# Patient Record
Sex: FEMALE | Race: WHITE
[De-identification: names, ages, dates, MRNs, and addresses within clinical notes are randomized per-mention and may not be internally consistent; named-entity substitution may affect disease eponyms.]

---

## 2019-11-16 ENCOUNTER — HOSPITAL ENCOUNTER (EMERGENCY)
Dept: HOSPITAL 33 - ED | Age: 24
LOS: 1 days | Discharge: HOME | End: 2019-11-17
Payer: COMMERCIAL

## 2019-11-16 DIAGNOSIS — Z79.899: ICD-10-CM

## 2019-11-16 DIAGNOSIS — F41.9: ICD-10-CM

## 2019-11-16 DIAGNOSIS — G40.909: Primary | ICD-10-CM

## 2019-11-16 DIAGNOSIS — F31.9: ICD-10-CM

## 2019-11-16 DIAGNOSIS — F19.10: ICD-10-CM

## 2019-11-16 PROCEDURE — 70450 CT HEAD/BRAIN W/O DYE: CPT

## 2019-11-16 PROCEDURE — 80053 COMPREHEN METABOLIC PANEL: CPT

## 2019-11-16 PROCEDURE — 80164 ASSAY DIPROPYLACETIC ACD TOT: CPT

## 2019-11-16 PROCEDURE — 80307 DRUG TEST PRSMV CHEM ANLYZR: CPT

## 2019-11-16 PROCEDURE — 83605 ASSAY OF LACTIC ACID: CPT

## 2019-11-16 PROCEDURE — 36415 COLL VENOUS BLD VENIPUNCTURE: CPT

## 2019-11-16 PROCEDURE — 71045 X-RAY EXAM CHEST 1 VIEW: CPT

## 2019-11-16 PROCEDURE — 84146 ASSAY OF PROLACTIN: CPT

## 2019-11-16 PROCEDURE — 99284 EMERGENCY DEPT VISIT MOD MDM: CPT

## 2019-11-16 PROCEDURE — 84703 CHORIONIC GONADOTROPIN ASSAY: CPT

## 2019-11-16 PROCEDURE — 85025 COMPLETE CBC W/AUTO DIFF WBC: CPT

## 2019-11-17 VITALS — SYSTOLIC BLOOD PRESSURE: 109 MMHG | HEART RATE: 97 BPM | DIASTOLIC BLOOD PRESSURE: 64 MMHG

## 2019-11-17 VITALS — OXYGEN SATURATION: 97 %

## 2019-11-17 LAB
ALBUMIN SERPL-MCNC: 3.8 G/DL (ref 3.5–5)
ALP SERPL-CCNC: 87 U/L (ref 38–126)
ALT SERPL-CCNC: 12 U/L (ref 0–35)
AMPHETAMINES UR QL: POSITIVE
ANION GAP SERPL CALC-SCNC: 12.2 MEQ/L (ref 5–15)
AST SERPL QL: 24 U/L (ref 14–36)
BARBITURATES UR QL: NEGATIVE
BASOPHILS # BLD AUTO: 0.01 10*3/UL (ref 0–0.4)
BASOPHILS NFR BLD AUTO: 0.2 % (ref 0–0.4)
BENZODIAZ UR QL SCN: POSITIVE
BILIRUB BLD-MCNC: 0.3 MG/DL (ref 0.2–1.3)
BLD SMEAR INTERP: YES
BUN SERPL-MCNC: 10 MG/DL (ref 7–17)
CALCIUM SPEC-MCNC: 9.3 MG/DL (ref 8.4–10.2)
CHLORIDE SERPL-SCNC: 101 MMOL/L (ref 98–107)
CO2 SERPL-SCNC: 32 MMOL/L (ref 22–30)
COCAINE UR QL SCN: NEGATIVE
CREAT SERPL-MCNC: 0.48 MG/DL (ref 0.52–1.04)
EOSINOPHIL # BLD AUTO: 0.02 10*3/UL (ref 0–0.5)
GLUCOSE SERPL-MCNC: 106 MG/DL (ref 74–106)
HCT VFR BLD AUTO: 32 % (ref 35–47)
HGB BLD-MCNC: 9.9 GM/DL (ref 12–16)
LYMPHOCYTES # SPEC AUTO: 1.39 10*3/UL (ref 1–4.6)
MCH RBC QN AUTO: 23 PG (ref 26–32)
MCHC RBC AUTO-ENTMCNC: 30.9 G/DL (ref 32–36)
METHADONE UR QL: NEGATIVE
MONOCYTES # BLD AUTO: 0.46 10*3/UL (ref 0–1.3)
OPIATES UR QL: POSITIVE
PCP UR QL CFM>20 NG/ML: NEGATIVE
PLATELET # BLD AUTO: 206 K/MM3 (ref 150–450)
POTASSIUM SERPLBLD-SCNC: 3.9 MMOL/L (ref 3.5–5.1)
PROT SERPL-MCNC: 8 G/DL (ref 6.3–8.2)
RBC # BLD AUTO: 4.3 M/MM3 (ref 4.1–5.4)
SODIUM SERPL-SCNC: 142 MMOL/L (ref 137–145)
THC UR QL SCN: NEGATIVE
WBC # BLD AUTO: 5.1 K/MM3 (ref 4–10.5)

## 2019-11-17 RX ADMIN — INSULIN HUMAN ONE MLS/HR: 100 INJECTION, SOLUTION PARENTERAL at 02:05

## 2019-11-17 NOTE — XRAY
Indication: Seizure.



Multiple contiguous axial images obtained through the head without contrast.



Comparison: None



Normal appearing brain parenchyma, ventricles, and bony calvarium.  Visualized

paranasal sinuses and mastoid air cells are clear.



Impression: Normal CT head without contrast exam.



Comment: Preliminary interpretation was made by [facility].  No discrepancy.



CTDI 59.97

## 2019-11-17 NOTE — XRAY
Indication: Seizure.



Comparison: None



Portable chest demonstrates normal heart, lungs, and bony thorax with

incidental left mediastinal/hilar/pulmonary calcified granulomas.

## 2019-11-17 NOTE — ERPHSYRPT
- History of Present Illness


Time Seen by Provider: 11/17/19 01:15


Source: patient, EMS


Exam Limitations: clinical condition


Patient Subjective Stated Complaint: pt states that she had seizure yesterday 

and today, pt states that she has infection in her spine, pt states that she 

gets infusions, pt states she had seizure after being pulled over by police, 

police states that pt convulsions for 2 minutes, police state that pt was 

lethargic prior to seizure, pt had seizure at 2320


Triage Nursing Assessment: pt came into the er via ambulance, pt is tearful, pt 

states that she had heroin at 1700,  pt states 10/10 pain to back, pupils 5 mm 

round and reactive,  and pushes normal


Physician History: 





patient is a 24-year-old white female was arrested by the police for drug abuse 

in position who had a seizure shortly after her arrest.  This was observed by 

the police she did seem postictal for a short time afterwards.


Timing/Duration: today


Severity of Symptoms-Max: moderate


Severity of Symptoms-Current: moderate


Associated Symptoms: anxiety


Previous symptoms: same symptoms as today


Allergies/Adverse Reactions: 








No Known Drug Allergies Allergy (Unverified 11/17/19 01:05)


 





Hx Tetanus, Diphtheria Vaccination/Date Given: No


Hx Influenza Vaccination/Date Given: No


Hx Pneumococcal Vaccination/Date Given: No





- Past Medical History


Neurological History: Seizures


Psycho-Social History: Anxiety, Bipolar, Depression


Other Medical History: discitis





- Social History


Smoking Status: Current every day smoker


How long have you smoked: 0.5


Exposure to second hand smoke: Yes


Drug Use: methamphetamines, heroin


Patient Lives Alone: Yes





- Female History


Hx Last Menstrual Period: 11/12/19


Hx Pregnant Now: No





- Review of Systems


Constitutional: No Fever, No Chills


Eyes: No Symptoms


Ears, Nose, & Throat: No Symptoms


Respiratory: No Cough, No Dyspnea


Cardiac: No Chest Pain, No Edema, No Syncope


Abdominal/Gastrointestinal: No Abdominal Pain, No Nausea, No Vomiting, No 

Diarrhea


Genitourinary Symptoms: No Dysuria


Musculoskeletal: No Back Pain, No Neck Pain


Skin: No Rash


Neurological: Seizure, No Dizziness, No Focal Weakness, No Sensory Changes


Psychological: No Symptoms


Endocrine: No Symptoms


All Other Systems: Reviewed and Negative





- Nursing Vital Signs


Nursing Vital Signs: 





 Initial Vital Signs











Pulse Rate  104 H  11/17/19 01:10


 


Blood Pressure  131/78   11/17/19 01:10


 


O2 Sat by Pulse Oximetry  100   11/17/19 01:10








 Pain Scale











Pain Intensity                 10

















- Physical Exam


General Appearance: mild distress


Eyes, Ears, Nose, Throat Exam: normal ENT inspection, moist mucous membranes


Neck Exam: normal inspection, non-tender, supple


Respiratory Exam: normal breath sounds, lungs clear, No respiratory distress


Cardiovascular Exam: regular rate/rhythm, No edema


Gastrointestinal/Abdominal Exam: soft, No tenderness, No distention


Extremities Exam: normal inspection, normal range of motion, No evidence of 

injury, No edema


Current Suicidality: denies suicide plan


Neurological Exam: alert, CNs II-XII nml as tested, oriented x 3


Skin Exam: normal color, warm, dry, No rash


SpO2: 100





- CT Exams


  ** Head


CT Interpretation: Negative, Tele-radiologist Report


Ordered Tests: 





 Active Orders 24 hr











 Category Date Time Status


 


 Clean Catch Urine Specimen STAT Care  11/17/19 00:30 Active


 


 IV Insertion STAT Care  11/17/19 00:01 Active


 


 CHEST 1 VIEW (PORTABLE) Stat Exams  11/17/19 00:01 Ordered


 


 HEAD WITHOUT CONTRAST [CT] Stat Exams  11/17/19 00:02 Ordered


 


 CBC W DIFF Stat Lab  11/17/19 00:37 Completed


 


 CMP Stat Lab  11/17/19 00:37 Completed


 


 HCG,QUALITATIVE URINE Stat Lab  11/17/19 01:14 Completed


 


 Lactic Acid Stat Lab  11/17/19 00:35 Completed


 


 Urine Triage Profile Stat Lab  11/17/19 01:14 Completed








Medication Summary











Generic Name Dose Route Start Last Admin





  Trade Name Freq  PRN Reason Stop Dose Admin


 


Valproate Sodium 500 mg/  105 mls @ 210 mls/hr  11/17/19 01:46  





  Sodium Chloride  IV  11/17/19 02:15  





  STAT ONE   





     





     





     





     














Discontinued Medications














Generic Name Dose Route Start Last Admin





  Trade Name Freq  PRN Reason Stop Dose Admin


 


Sodium Chloride  Confirm  11/17/19 01:58  





  Sodium Chloride 0.9% 100 Ml Ivpb  Administered  11/17/19 01:59  





  Dose   





  100 mls @ ud   





  IV   





  .STK-MED ONE   





     





     





     





     


 


Valproate Sodium  Confirm  11/17/19 01:58  





  Depacon 500 Mg/5 Ml***  Administered  11/17/19 01:59  





  Dose   





  500 mg   





  IV   





  .STK-MED ONE   





     





     





     





     











Lab/Rad Data: 





 Laboratory Result Diagrams





 11/17/19 00:37 





 11/17/19 00:37 





 Laboratory Results











  11/17/19 11/17/19 11/17/19 Range/Units





  01:14 00:37 00:37 


 


WBC     (4.0-10.5)  K/mm3


 


RBC     (4.1-5.4)  M/mm3


 


Hgb     (12.0-16.0)  gm/dl


 


Hct     (35-47)  %


 


MCV     ()  fl


 


MCH     (26-32)  pg


 


MCHC     (32-36)  g/dl


 


RDW     (11.5-14.0)  %


 


Plt Count     (150-450)  K/mm3


 


MPV     (6-9.5)  fl


 


Gran %     (36.0-66.0)  %


 


Eos # (Auto)     (0-0.5)  


 


Absolute Lymphs (auto)     (1.0-4.6)  


 


Absolute Monos (auto)     (0.0-1.3)  


 


Lymphocytes %     (24.0-44.0)  %


 


Monocytes %     (0.0-12.0)  %


 


Eosinophils %     (0.00-5.0)  %


 


Basophils %     (0.0-0.4)  %


 


Absolute Granulocytes     (1.4-6.9)  


 


Basophils #     (0-0.4)  


 


Sodium    142  (137-145)  mmol/L


 


Potassium    3.9  (3.5-5.1)  mmol/L


 


Chloride    101  ()  mmol/L


 


Carbon Dioxide    32 H  (22-30)  mmol/L


 


Anion Gap    12.2  (5-15)  MEQ/L


 


BUN    10  (7-17)  mg/dL


 


Creatinine    0.48 L  (0.52-1.04)  mg/dL


 


Estimated GFR    > 60.0  ML/MIN


 


Glucose    106  ()  mg/dL


 


Lactic Acid     (0.4-2.0)  


 


Calcium    9.3  (8.4-10.2)  mg/dL


 


Total Bilirubin    0.30  (0.2-1.3)  mg/dL


 


AST    24  (14-36)  U/L


 


ALT    12  (0-35)  U/L


 


Alkaline Phosphatase    87  ()  U/L


 


Serum Total Protein    8.0  (6.3-8.2)  g/dL


 


Albumin    3.8  (3.5-5.0)  g/dL


 


Urine HCG, Qual  NEGATIVE    (Negative)  


 


Valproic Acid   < 10.0 L   ()  ug/mL


 


Slides for Path Review     














  11/17/19 11/17/19 Range/Units





  00:37 00:35 


 


WBC  5.1   (4.0-10.5)  K/mm3


 


RBC  4.30   (4.1-5.4)  M/mm3


 


Hgb  9.9 L   (12.0-16.0)  gm/dl


 


Hct  32.0 L   (35-47)  %


 


MCV  74.4 L   ()  fl


 


MCH  23.0 L   (26-32)  pg


 


MCHC  30.9 L   (32-36)  g/dl


 


RDW  17.6 H   (11.5-14.0)  %


 


Plt Count  206   (150-450)  K/mm3


 


MPV  8.9   (6-9.5)  fl


 


Gran %  63.5   (36.0-66.0)  %


 


Eos # (Auto)  0.02   (0-0.5)  


 


Absolute Lymphs (auto)  1.39   (1.0-4.6)  


 


Absolute Monos (auto)  0.46   (0.0-1.3)  


 


Lymphocytes %  27.0   (24.0-44.0)  %


 


Monocytes %  8.9   (0.0-12.0)  %


 


Eosinophils %  0.4   (0.00-5.0)  %


 


Basophils %  0.2   (0.0-0.4)  %


 


Absolute Granulocytes  3.26   (1.4-6.9)  


 


Basophils #  0.01   (0-0.4)  


 


Sodium    (137-145)  mmol/L


 


Potassium    (3.5-5.1)  mmol/L


 


Chloride    ()  mmol/L


 


Carbon Dioxide    (22-30)  mmol/L


 


Anion Gap    (5-15)  MEQ/L


 


BUN    (7-17)  mg/dL


 


Creatinine    (0.52-1.04)  mg/dL


 


Estimated GFR    ML/MIN


 


Glucose    ()  mg/dL


 


Lactic Acid   0.9  (0.4-2.0)  


 


Calcium    (8.4-10.2)  mg/dL


 


Total Bilirubin    (0.2-1.3)  mg/dL


 


AST    (14-36)  U/L


 


ALT    (0-35)  U/L


 


Alkaline Phosphatase    ()  U/L


 


Serum Total Protein    (6.3-8.2)  g/dL


 


Albumin    (3.5-5.0)  g/dL


 


Urine HCG, Qual    (Negative)  


 


Valproic Acid    ()  ug/mL


 


Slides for Path Review  YES   














- Progress


Progress: improved


Counseled pt/family regarding: drug and/or alcohol abuse





- Departure


Departure Disposition: correction/detention


Clinical Impression: 


 Seizure disorder





Condition: Stable


Critical Care Time: No


Referrals: 


Provider,Unknown [Primary Care Provider] - 


Plan of Treatment: 


treatment plan will be to restart her on Depakote 500 mg twice a day to prevent 

seizures


Prescriptions: 


Divalproex Sodium [Depakote] 500 mg PO BID 30 Days #60 tablet.

## 2019-11-22 ENCOUNTER — HOSPITAL ENCOUNTER (EMERGENCY)
Dept: HOSPITAL 33 - ED | Age: 24
LOS: 1 days | Discharge: NURSING, REHAB OR LONG-TERM CARE | End: 2019-11-23
Payer: COMMERCIAL

## 2019-11-22 VITALS — OXYGEN SATURATION: 100 %

## 2019-11-22 DIAGNOSIS — G40.909: Primary | ICD-10-CM

## 2019-11-22 DIAGNOSIS — E87.6: ICD-10-CM

## 2019-11-22 PROCEDURE — 85025 COMPLETE CBC W/AUTO DIFF WBC: CPT

## 2019-11-22 PROCEDURE — 80053 COMPREHEN METABOLIC PANEL: CPT

## 2019-11-22 PROCEDURE — 82962 GLUCOSE BLOOD TEST: CPT

## 2019-11-22 PROCEDURE — 80164 ASSAY DIPROPYLACETIC ACD TOT: CPT

## 2019-11-22 PROCEDURE — 36415 COLL VENOUS BLD VENIPUNCTURE: CPT

## 2019-11-22 PROCEDURE — 99284 EMERGENCY DEPT VISIT MOD MDM: CPT

## 2019-11-22 PROCEDURE — 83605 ASSAY OF LACTIC ACID: CPT

## 2019-11-22 NOTE — ERPHSYRPT
- History of Present Illness


Time Seen by Provider: 11/22/19 23:42


Source: patient, EMS


Exam Limitations: no limitations


Physician History: 





24 years old female with history of IV drug abuse, seizure disorder not taking 

any medications from anxiety, depression currently in the [facility] for possession 

of drugs charges is brought in the ER with  2 seizure witnessed by [facility] staff 

prior to arrival.  There were generalized tonic-clonic with postictal phase for 

few minutes which is improved by the time patient presented in the ER.  Patient 

reports she remembers smelling strange and nothing not worse.  She usually have 

strange smell before seizure.  Regarding the [facility] staff she did not hit her 

head.  She is not complaining of headache, chest pain palpitations or shortness 

of breath.  No abdominal pain nausea or vomiting.  Patient is supposed to take 

Depakote but is not taking as she has decreased oral intake and it makes her 

more nauseated been vomiting.  No fever or chills reported.  Last drug use was 

almost a week ago.


Timing/Duration: today


Severity: moderate


Associated Symptoms: seizures


Allergies/Adverse Reactions: 








No Known Drug Allergies Allergy (Unverified 11/17/19 01:05)


 





Hx Tetanus, Diphtheria Vaccination/Date Given: No


Hx Influenza Vaccination/Date Given: No


Hx Pneumococcal Vaccination/Date Given: No





- Review of Systems


Constitutional: Fatigue


Eyes: No Symptoms


Ears, Nose, & Throat: Nose Congestion


Respiratory: No Symptoms


Cardiac: No Symptoms


Abdominal/Gastrointestinal: No Symptoms


Genitourinary Symptoms: No Symptoms


Musculoskeletal: No Symptoms


Skin: No Symptoms


Neurological: Seizure


Psychological: Anxiety, Other


Endocrine: No Symptoms


Hematologic/Lymphatic: No Symptoms


Immunological/Allergic: No Symptoms





- Past Medical History


Neurological History: Seizures


Psycho-Social History: Anxiety, Bipolar, Depression


Other Medical History: discitis





- Social History


Smoking Status: Current every day smoker


How long have you smoked: 0.5


Exposure to second hand smoke: Yes


Drug Use: methamphetamines, heroin


Patient Lives Alone: Yes





- Nursing Vital Signs


Nursing Vital Signs: 


 Initial Vital Signs











Temperature  98.4 F   11/22/19 23:42


 


Pulse Rate  56 L  11/22/19 23:42


 


Respiratory Rate  15   11/22/19 23:42


 


Blood Pressure  106/62   11/22/19 23:42


 


O2 Sat by Pulse Oximetry  100   11/22/19 23:42








 Pain Scale











Pain Intensity                 4

















- [place] Coma Scale


Best Eye Response ([place]): (4) open spontaneously


Best Verbal Response ([place]): (5) oriented


Best Motor Response ([place]annie): (6) obeys commands


[place] Total: 15





- Physical Exam


General Appearance: no apparent distress, alert


Eye Exam: bilateral eye: normal inspection, PERRL, EOMI


Ears, Nose, Throat Exam: normal ENT inspection, pharynx normal


Neck Exam: normal inspection, non-tender, supple, full range of motion


Respiratory: normal breath sounds, lungs clear, respiratory distress


Cardiovascular: regular rate/rhythm, normal heart sounds, normal peripheral 

pulses


Gastrointestinal: soft, normal bowel sounds, No tenderness, No distention


Back Exam: normal inspection, normal range of motion, No CVA tenderness


Extremity Exam: normal inspection, normal range of motion


Mental Status: alert, oriented x 3, cooperative


CNs Exam: normal hearing, normal speech, PERRL


Coordination/Gait: normal finger to nose, normal cerebellar function


Motor/Sensory: no motor deficit, no sensory deficit, no pronator drift


DTR: tricep (R): 2+, tricep (L): 2+, knee (R): 2+, knee (L): 2+


Skin Exam: normal color, warm, dry


**SpO2 Interpretation**: normal


O2 Delivery: Room Air





- Course


Nursing assessment & vital signs reviewed: Yes


Ordered Tests: 


 Active Orders 24 hr











 Category Date Time Status


 


 Accucheck STAT Care  11/22/19 23:44 Active


 


 IV Insertion STAT Care  11/22/19 23:44 Active


 


 Lactic Acid Stat Lab  11/22/19 23:44 Completed








Medication Summary














Discontinued Medications














Generic Name Dose Route Start Last Admin





  Trade Name Freq  PRN Reason Stop Dose Admin


 


Divalproex Sodium  1,000 mg  11/23/19 01:00  11/23/19 01:12





  Depakote Extended Release 250 Mg***  PO  12/23/19 00:59  1,000 mg





  1XONLY RAFFI   Administration





     





     





     





     


 


Sodium Chloride  1,000 mls @ 999 mls/hr  11/22/19 23:44  11/23/19 01:13





  Sodium Chloride 0.9% 1000 Ml  IV  11/23/19 00:44  Not Given





  .Q1H1M STA   





     





     





     





     


 


Valproate Sodium 500 mg/  105 mls @ 210 mls/hr  11/22/19 23:44  11/23/19 01:13





  Sodium Chloride  IV  11/23/19 00:13  Not Given





  STAT ONE   





     





     





     





     


 


Sodium Chloride  Confirm  11/22/19 23:51  





  Sodium Chloride 0.9% 100 Ml Ivpb  Administered  11/22/19 23:52  





  Dose   





  100 mls @ ud   





  IV   





  .STK-MED ONE   





     





     





     





     


 


Potassium Chloride  40 meq  11/23/19 00:56  11/23/19 01:12





  Klor Con 10 Meq***  PO  11/23/19 00:57  40 meq





  STAT ONE   Administration





     





     





     





     


 


Potassium Chloride  Confirm  11/23/19 01:01  





  Klor Con 10 Meq***  Administered  11/23/19 01:02  





  Dose   





  40 meq   





  PO   





  .STK-MED ONE   





     





     





     





     


 


Valproate Sodium  Confirm  11/22/19 23:50  





  Depacon 500 Mg/5 Ml***  Administered  11/22/19 23:51  





  Dose   





  500 mg   





  IV   





  .STK-MED ONE   





     





     





     





     











Lab/Rad Data: 


 Laboratory Result Diagrams





 11/22/19 00:10 





 11/22/19 00:10 





 Laboratory Results











  11/23/19 11/22/19 11/22/19 Range/Units





  00:15 00:10 00:10 


 


WBC     (4.0-10.5)  K/mm3


 


RBC     (4.1-5.4)  M/mm3


 


Hgb     (12.0-16.0)  gm/dl


 


Hct     (35-47)  %


 


MCV     ()  fl


 


MCH     (26-32)  pg


 


MCHC     (32-36)  g/dl


 


RDW     (11.5-14.0)  %


 


Plt Count     (150-450)  K/mm3


 


MPV     (6-9.5)  fl


 


Gran %     (36.0-66.0)  %


 


Eos # (Auto)     (0-0.5)  


 


Absolute Lymphs (auto)     (1.0-4.6)  


 


Absolute Monos (auto)     (0.0-1.3)  


 


Lymphocytes %     (24.0-44.0)  %


 


Monocytes %     (0.0-12.0)  %


 


Eosinophils %     (0.00-5.0)  %


 


Basophils %     (0.0-0.4)  %


 


Absolute Granulocytes     (1.4-6.9)  


 


Basophils #     (0-0.4)  


 


Sodium    142  (137-145)  mmol/L


 


Potassium    3.1 L  (3.5-5.1)  mmol/L


 


Chloride    104  ()  mmol/L


 


Carbon Dioxide    27  (22-30)  mmol/L


 


Anion Gap    14.0  (5-15)  MEQ/L


 


BUN    7  (7-17)  mg/dL


 


Creatinine    0.53  (0.52-1.04)  mg/dL


 


Estimated GFR    > 60.0  ML/MIN


 


Glucose    94  ()  mg/dL


 


Lactic Acid  1.5    (0.4-2.0)  


 


Calcium    9.1  (8.4-10.2)  mg/dL


 


Total Bilirubin    0.30  (0.2-1.3)  mg/dL


 


AST    15  (14-36)  U/L


 


ALT    10  (0-35)  U/L


 


Alkaline Phosphatase    86  ()  U/L


 


Serum Total Protein    7.9  (6.3-8.2)  g/dL


 


Albumin    3.9  (3.5-5.0)  g/dL


 


Valproic Acid   < 10.0 L   ()  ug/mL














  11/22/19 Range/Units





  00:10 


 


WBC  8.0  (4.0-10.5)  K/mm3


 


RBC  4.64  (4.1-5.4)  M/mm3


 


Hgb  10.7 L  (12.0-16.0)  gm/dl


 


Hct  33.8 L  (35-47)  %


 


MCV  72.8 L  ()  fl


 


MCH  23.0 L  (26-32)  pg


 


MCHC  31.7 L  (32-36)  g/dl


 


RDW  18.6 H  (11.5-14.0)  %


 


Plt Count  261  (150-450)  K/mm3


 


MPV  9.1  (6-9.5)  fl


 


Gran %  61.9  (36.0-66.0)  %


 


Eos # (Auto)  0.01  (0-0.5)  


 


Absolute Lymphs (auto)  2.34  (1.0-4.6)  


 


Absolute Monos (auto)  0.66  (0.0-1.3)  


 


Lymphocytes %  29.4  (24.0-44.0)  %


 


Monocytes %  8.3  (0.0-12.0)  %


 


Eosinophils %  0.1  (0.00-5.0)  %


 


Basophils %  0.3  (0.0-0.4)  %


 


Absolute Granulocytes  4.92  (1.4-6.9)  


 


Basophils #  0.02  (0-0.4)  


 


Sodium   (137-145)  mmol/L


 


Potassium   (3.5-5.1)  mmol/L


 


Chloride   ()  mmol/L


 


Carbon Dioxide   (22-30)  mmol/L


 


Anion Gap   (5-15)  MEQ/L


 


BUN   (7-17)  mg/dL


 


Creatinine   (0.52-1.04)  mg/dL


 


Estimated GFR   ML/MIN


 


Glucose   ()  mg/dL


 


Lactic Acid   (0.4-2.0)  


 


Calcium   (8.4-10.2)  mg/dL


 


Total Bilirubin   (0.2-1.3)  mg/dL


 


AST   (14-36)  U/L


 


ALT   (0-35)  U/L


 


Alkaline Phosphatase   ()  U/L


 


Serum Total Protein   (6.3-8.2)  g/dL


 


Albumin   (3.5-5.0)  g/dL


 


Valproic Acid   ()  ug/mL














- Progress


Progress: improved, re-examined


Progress Note: 


patient is back to her baseline on presentation in the ER.  Nonfocal neuro 

exam.  She is given loading dose of Depakote.  Workup is grossly negative 

except for mildly low potassium for which she is on replacement.  She has an 

obvious reason for seizing being noncompliant with medications.  I will give 

her Zofran to take as needed for nausea and continue with Depakote at the [facility].

  I do not think she needs any further workup or other imaging with unknown 

diagnosis and nonfocal neuro exam.  At this point patient is being discharged 

back to [facility].


11/23/19 02:09











Counseled pt/family regarding: lab results, diagnosis, need for follow-up





- Departure


Departure Disposition: long term/Prison


Clinical Impression: 


 Seizure disorder, Hypokalemia





Condition: Stable


Critical Care Time: No


Referrals: 


Provider,Unknown [Primary Care Provider] - 


MALCOM MAIER [NON-STAFF PHY W/O PRIVILEGES] - Follow Up with PCP/3 days


Instructions:  Hypokalemia (DC), Seizures, Adult (DC)


Additional Instructions: 


take seizure medication as recommended.  Followup with primary care and 

neurology for reevaluation.  Return to ER for any worsening.  Takes Zofran as 

needed for nausea.


Prescriptions: 


Ondansetron ODT 4 MG*** [Zofran Odt 4 mg***] 4 mg PO Q6H PRN PRN #10 tab.rapdis


 PRN Reason: Nausea

## 2019-11-23 VITALS — SYSTOLIC BLOOD PRESSURE: 104 MMHG | DIASTOLIC BLOOD PRESSURE: 56 MMHG | HEART RATE: 51 BPM

## 2019-11-23 LAB
ALBUMIN SERPL-MCNC: 3.9 G/DL (ref 3.5–5)
ALP SERPL-CCNC: 86 U/L (ref 38–126)
ALT SERPL-CCNC: 10 U/L (ref 0–35)
ANION GAP SERPL CALC-SCNC: 14 MEQ/L (ref 5–15)
AST SERPL QL: 15 U/L (ref 14–36)
BASOPHILS # BLD AUTO: 0.02 10*3/UL (ref 0–0.4)
BASOPHILS NFR BLD AUTO: 0.3 % (ref 0–0.4)
BILIRUB BLD-MCNC: 0.3 MG/DL (ref 0.2–1.3)
BUN SERPL-MCNC: 7 MG/DL (ref 7–17)
CALCIUM SPEC-MCNC: 9.1 MG/DL (ref 8.4–10.2)
CHLORIDE SERPL-SCNC: 104 MMOL/L (ref 98–107)
CO2 SERPL-SCNC: 27 MMOL/L (ref 22–30)
CREAT SERPL-MCNC: 0.53 MG/DL (ref 0.52–1.04)
EOSINOPHIL # BLD AUTO: 0.01 10*3/UL (ref 0–0.5)
GLUCOSE SERPL-MCNC: 94 MG/DL (ref 74–106)
HCT VFR BLD AUTO: 33.8 % (ref 35–47)
HGB BLD-MCNC: 10.7 GM/DL (ref 12–16)
LYMPHOCYTES # SPEC AUTO: 2.34 10*3/UL (ref 1–4.6)
MCH RBC QN AUTO: 23 PG (ref 26–32)
MCHC RBC AUTO-ENTMCNC: 31.7 G/DL (ref 32–36)
MONOCYTES # BLD AUTO: 0.66 10*3/UL (ref 0–1.3)
PLATELET # BLD AUTO: 261 K/MM3 (ref 150–450)
POTASSIUM SERPLBLD-SCNC: 3.1 MMOL/L (ref 3.5–5.1)
PROT SERPL-MCNC: 7.9 G/DL (ref 6.3–8.2)
RBC # BLD AUTO: 4.64 M/MM3 (ref 4.1–5.4)
SODIUM SERPL-SCNC: 142 MMOL/L (ref 137–145)
WBC # BLD AUTO: 8 K/MM3 (ref 4–10.5)